# Patient Record
Sex: FEMALE | Race: BLACK OR AFRICAN AMERICAN | NOT HISPANIC OR LATINO | URBAN - METROPOLITAN AREA
[De-identification: names, ages, dates, MRNs, and addresses within clinical notes are randomized per-mention and may not be internally consistent; named-entity substitution may affect disease eponyms.]

---

## 2024-01-26 ENCOUNTER — TELEMEDICINE (OUTPATIENT)
Dept: BEHAVIORAL HEALTH | Facility: CLINIC | Age: 49
End: 2024-01-26
Payer: COMMERCIAL

## 2024-01-26 DIAGNOSIS — F41.1 ANXIETY, GENERALIZED: Primary | ICD-10-CM

## 2024-01-26 PROCEDURE — 90791 PSYCH DIAGNOSTIC EVALUATION: CPT | Performed by: PSYCHOLOGIST

## 2024-01-26 NOTE — PROGRESS NOTES
Initial Assessment  Start Time 1 pm  End Time 1:55 pm  Documentation 10 min  Dx Anxiety  No falls and no tobacco use for 6 months.    Rudy is a 49 year old  mother of 9 year old Sri. 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 Originally from [place] she moved to [place] 2.5 years ago.  FOB of sri, Patsy, narcissist and little interest in Sri.  when he was 4.  In longdistance relationship with Juan, 58. They have known each other for years and dated before she  patsy but he had 3 sons and did not want more children and she wanted to parent.  Now they reconnected in July and [facility].   She has Mirena and no symptoms of perimenopause yet.  Her chief complain is her difficulty with partnered orgasm.  She is 100% reliably orgasmic with a vibrator and only 5-10% reliably orgasmic with intercourse.  I provided education about female sexual function and the need for clitoral stimulation and often vibration.  She is not self-conscious but Juan was put off by her suggestion about adding a vibrator to their lovemaking. I provided some suggestions about how to explain female sexual function to him to reduce his own feelings about lack of prowess. I also provided some context around sensate focus exercises to enhance the idea of reducing anxiety and to be more mindful. She admits she is not comfortable talking about sex (her friends call her a prude) and I have encouraged her to be more open with her friends too.  She is to follow up with me in 3-4 weeks.

## 2024-01-26 NOTE — LETTER
January 26, 2024     Minnie Sexton MD  [address] [facility] [id number]    Patient: Rudy Gonsalez   YOB: 1975   Date of Visit: 1/26/2024       Dear Dr. Minnie Sexton MD:    Thank you for referring Rudy Gonsalez to me for evaluation. Below are my notes for this consultation.  If you have questions, please do not hesitate to call me. I look forward to following your patient along with you.       Sincerely,     Kadie Carey, PhD      CC: No Recipients  ______________________________________________________________________________________    Initial Assessment  Start Time 1 pm  End Time 1:55 pm  Documentation 10 min  Dx Anxiety  No falls and no tobacco use for 6 months.    Rudy is a 49 year old  mother of 9 year old Vargas. 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 Originally from [place] she moved to [place] 2.5 years ago.  FOB of Patsy fierro, narcissist and little interest in Vargas.  when he was 3.  In longdistance relationship with Roxy Martinez. They have known each other for years and dated before she  patsy but he had 3 sons and did not want more children and she wanted to parent.  Now they reconnected in July and currently in [place].   She has Mirena and no symptoms of perimenopause yet.  Her chief complain is her difficulty with partnered orgasm.  She is 100% reliably orgasmic with a vibrator and only 5-10% reliably orgasmic with intercourse.  I provided education about female sexual function and the need for clitoral stimulation and often vibration.  She is not self-conscious but Juan was put off by her suggestion about adding a vibrator to their lovemaking. I provided some suggestions about how to explain female sexual function to him to reduce his own feelings about lack of prowess. I also provided some context around sensate focus exercises to enhance the idea of reducing anxiety and to be more mindful. She  admits she is not comfortable talking about sex (her friends call her a prude) and I have encouraged her to be more open with her friends too.  She is to follow up with me in 3-4 weeks.

## 2024-03-08 ENCOUNTER — TELEMEDICINE (OUTPATIENT)
Dept: BEHAVIORAL HEALTH | Facility: CLINIC | Age: 49
End: 2024-03-08
Payer: COMMERCIAL

## 2024-03-08 DIAGNOSIS — F41.8 ANXIETY ABOUT HEALTH: Primary | ICD-10-CM

## 2024-03-08 PROCEDURE — 90837 PSYTX W PT 60 MINUTES: CPT | Performed by: PSYCHOLOGIST

## 2024-03-08 NOTE — PROGRESS NOTES
Start Time: 3  End Time: 3:55  Documentation time 5    Focus of treatment:   Modality of treatment: CBT  Frequency of treatment: monthly    Diagnosis: anxiety about health    Treatment plan: cbt and education    Symptoms: anorgasmia and anxiety about sexuality    Prognosis: excellent    Progress to date: reliably orgasmic with recommended vibrator and gave her communication tools to discuss with Juan.  Will also work on other potential tools and will see me in 4 weeks.

## 2024-04-05 ENCOUNTER — APPOINTMENT (OUTPATIENT)
Dept: BEHAVIORAL HEALTH | Facility: CLINIC | Age: 49
End: 2024-04-05
Payer: COMMERCIAL